# Patient Record
(demographics unavailable — no encounter records)

---

## 2025-05-30 NOTE — HISTORY OF PRESENT ILLNESS
[postmenopausal] : postmenopausal [N] : Patient is not sexually active [Y] : Positive pregnancy history [Previously active] : previously active [Mammogramdate] : 2024 [TextBox_19] : SUFFOLK BREAST [BreastSonogramDate] : 2024 [TextBox_25] : DENSE TISSUE [PapSmeardate] : 2023 [BoneDensityDate] : 2024 [TextBox_37] : PCP  [ColonoscopyDate] : 04/16/25 [TextBox_43] : DR. PICKARD [PGxTotal] : 1 [[facility] HospitalxFulerm] : 1 [[facility] Centeriving] : 1 [FreeTextEntry1] :